# Patient Record
Sex: FEMALE | Race: WHITE | NOT HISPANIC OR LATINO | URBAN - METROPOLITAN AREA
[De-identification: names, ages, dates, MRNs, and addresses within clinical notes are randomized per-mention and may not be internally consistent; named-entity substitution may affect disease eponyms.]

---

## 2020-11-03 ENCOUNTER — OFFICE VISIT (OUTPATIENT)
Dept: URBAN - METROPOLITAN AREA CLINIC 23 | Facility: CLINIC | Age: 55
End: 2020-11-03
Payer: OTHER GOVERNMENT

## 2020-11-03 DIAGNOSIS — R19.7 ACUTE DIARRHEA: ICD-10-CM

## 2020-11-03 PROCEDURE — 99243 OFF/OP CNSLTJ NEW/EST LOW 30: CPT | Performed by: INTERNAL MEDICINE

## 2020-11-03 PROCEDURE — 99203 OFFICE O/P NEW LOW 30 MIN: CPT | Performed by: INTERNAL MEDICINE

## 2020-11-03 PROCEDURE — 1036F TOBACCO NON-USER: CPT | Performed by: INTERNAL MEDICINE

## 2020-11-03 PROCEDURE — G9903 PT SCRN TBCO ID AS NON USER: HCPCS | Performed by: INTERNAL MEDICINE

## 2020-11-03 PROCEDURE — G8427 DOCREV CUR MEDS BY ELIG CLIN: HCPCS | Performed by: INTERNAL MEDICINE

## 2020-11-03 PROCEDURE — 3017F COLORECTAL CA SCREEN DOC REV: CPT | Performed by: INTERNAL MEDICINE

## 2020-11-03 PROCEDURE — G8417 CALC BMI ABV UP PARAM F/U: HCPCS | Performed by: INTERNAL MEDICINE

## 2020-11-03 PROCEDURE — G9622 NO UNHEAL ETOH USER: HCPCS | Performed by: INTERNAL MEDICINE

## 2020-11-03 RX ORDER — TELMISARTAN AND HYDROCHLOROTHIAZIDE 40; 12.5 MG/1; MG/1
1 TABLET TABLET ORAL ONCE A DAY
Status: ACTIVE | COMMUNITY

## 2020-11-03 RX ORDER — LEVOTHYROXINE SODIUM 50 UG/1
1 TABLET IN THE MORNING ON AN EMPTY STOMACH TABLET ORAL ONCE A DAY
Status: ACTIVE | COMMUNITY

## 2020-11-03 RX ORDER — PANTOPRAZOLE SODIUM 40 MG/1
1 TABLET TABLET, DELAYED RELEASE ORAL ONCE A DAY
Status: ACTIVE | COMMUNITY

## 2020-11-03 NOTE — HPI-TODAY'S VISIT:
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  Up to 10 times a day.  Liquidy stool.  No blood in stool.  Lower abdominal cramping pain.  She had a stool test done with primary care provider, C. difficile, culture, O&P.  All negative.  She started taking Cipro 3 days ago.  She also use Imodium as needed.  It is working great.  No bowel movements today since that she took it yesterday.  Also hemorrhoids is flaring, she is using Proctosol which is working great.  Denies nausea vomiting fever.  Last colonoscopy April 2019, normal but a subcentimeter polyp.  She was told repeat colonoscopy in 10 years.  No family history of colon cancer or inflammatory bowel disease.  She has been taking Protonix since June this year. Her normal bowel habit is regular, 2 bowel movements, formed stool.   The patient was referred by Dr. Juan _____for diarrhea_____ .   A copy of this document is being forwarded to the referring provider.

## 2020-11-03 NOTE — PHYSICAL EXAM CHEST:
Overdue for wellness exam. Will order mammogram at that time. chest wall non-tender,breathing is unlabored without accessory muscle use,normal breath sounds

## 2020-11-03 NOTE — PREVIOUS WORKUP REVIEWED
: ENDOSCOPIES:  LABS:  -Labs 10/22/2020: total bilirubin 0.6, alkaline phosphatase, AST 16, ALT 14, BUN 13, creatinine 1.15. IMAGES:

## 2021-01-12 ENCOUNTER — DASHBOARD ENCOUNTERS (OUTPATIENT)
Age: 56
End: 2021-01-12